# Patient Record
(demographics unavailable — no encounter records)

---

## 2019-01-01 NOTE — FRENECTOMY PROCEDURE NOTE
Procedure Note


Preoperative


Date of Service:  Jul 9, 2019


Time of Procedure:  12:30





Vital Signs








  Date Time  Temp Pulse Resp B/P (MAP) Pulse Ox O2 Delivery O2 Flow Rate FiO2


 


7/9/19 02:05 98.3 110 38     








Indication


Ankyloglossia


Risk/Time Out





Risk and benefits explained to patient or legal guardian, verbal and written 

consent given.





Time out performed, verified correct patient, correct procedure, correct site, 

and consent documented.


Technique





Lingual Frenectomy





Procedure





Infant was swaddled, securing the arms. The infant's head was held secure and 

the mouth was gently held open. A grooved tongue retracted was used to elevate 

the tongue and frenulum scissors were used to clip the lingual frenulum 

anteriorly until the tongue was able to move out to the lips.





Minimal blood loss, less than 1 mL





No Complications











STEPHAN VICENTE MD                Jul 9, 2019 12:44

## 2019-01-01 NOTE — NEWBORN INFANT H&P-ADMISSION
[place] Infant Record


Exam Date & Time


Date seen by provider:  2019


Time seen by provider:  09:44





Provider


PCP


Dr. Vallecillo





Delivery Assessment


Expected Date of Delivery:  Jul 10, 2019


Hx :  2


Hx Para:  2


Gestational Age in Weeks:  39


Gestational Age in Days:  6


Delivery Date:  2019


Delivery Time:  0007


Condition of Infant:  Living


Infant Delivery Method:  Spontaneous Vaginal


Operative Indications (Cesarea:  N/A-Vaginal Delivery


Prenatal Events:  Routine Prenatal care


Gender:  Male


Viability:  Living





Mother's Group Strep


Mother's Group B Strep:  Negative





Maternal Labs


Blood Type:  O+


HIV:  Negative


Hep B:  Negative


Rubella:  Immune


Triple/Quad Screen:  Normal





Apgar Score


Apgar Score at 1 Minute:  8


Apgar Score at 5 Minutes:  9





Condition/Feeding


Benefits of breastfeeding discussed with mother.


 Feeding Method:  Breast Milk-Exclusive


Gestation:  Single





Admission Examination


Level of Alertness:  Alert


Cry Description:  Lusty


Activity/State:  Quiet Alert


Suckling:  Suckled w Encouragement


Head Circumference:  14.25


Fontanelles:  Soft, Flat; No Bulging, No Full, No Depressed, No Tight


Anterior [place] Descriptio:  WNL


Sclera Description:  Clear; No Drainage, No Reddened, No Inflammation, No Edema,

No Tearing


Ears:  Normal


Mouth, Nose, Eyes:  Hard & Soft Palate Intact; No Cleft Nares; Nares Patent 

Bilateral; No Cleft Palate


Neck:  Head Mobile, Clavicles Intact


Chest Circumference:  13.00


Cardiovascular:  Regular Rhythm, Murmur (3/6 at loudest at the LLSB.), Brachial 

Pulses Equal; No Distant Sounds; Femoral Pulses Equal


Respiratory:  Regular; No Irregular, No Nasal Flaring, No Expiratory Grunt, No 

Unlabored, No Labored, No Retractions


Breath Sounds:  Clear; No Crackles; Equal; No Wheezes


Abdomen:  Soft; No Distended; Bowel Sounds Audible


Abdomen Circumference:  11.50


Genitalia:  Appear Normal, Testicles Descended


Back:  Spine Closed, Gluteal Folds Equal, Anus Patent, Sacral Dimple


Hips:  WNL


Movement:  Symmetric-Body, Full ROM, Symmetric-Face


Muscle Tone:  Active


Extremities:  5 digits present on each extremity


Reflexes:  [place], Suck, Grasp-Bilateral





Weight/Height


Height (Inches):  19.50


Height (Calculated Centimeters:  49.544519


Weight (Pounds):  7


Weight (Ounces):  10.0


Weight (Calculated Kilograms):  3.594220


Weight (Calculated Grams):  3458.642





Vital Signs





Vital Signs








  Date Time  Temp Pulse Resp B/P (MAP) Pulse Ox O2 Delivery O2 Flow Rate FiO2


 


19 02:05 98.3 110 38     


 


19 00:45 99.0       








Laboratory Tests


19 09:35: 





Impression on Admission


Impression on Admission:  Living, Term


39 6/7 WGA infant born via  to a  now 2 mom.  Mom had fever of 100.4 in

labor.





Progress/Plan/Problem List





(1) 


Qualifiers:  


   Qualified Codes:  Z38.2 - Single liveborn infant, unspecified as to place of 

birth


Assessment & Plan:  1.  Received Vitamin K and Erythromycin


2.  Needs Hep B.


3.  Needs state  screen.


4.  Needs CCHD screen.


5.  Needs hearing screen.


6.  Plan f/u with Dr. Vallecillo.





(2) At risk for sepsis in 


Assessment & Plan:  Obtain CXR, CBC, CRP, and blood culture due to maternal 

fever.  Infant has had 2 episodes of color change.  Will start antibiotics.  





(3) At risk for hyperbilirubinemia in 


Assessment & Plan:  Obtain routine bili at 24 hours.





(4) Tight lingual frenulum


Assessment & Plan:  Will do frenulotomy today.








Copy


Copies To 1:   CECILY VALLECILLO MD, SUSAN L MD                2019 09:46

## 2019-01-01 NOTE — NUR
IV infiltrated and removed, IV start attempted in left antecub, good flash but unable to run 
fluids, IV cath removed and infant wrapped and returned to mother. Mother educated on POC 
and will return with next shift to establish an IV.

## 2019-01-01 NOTE — NUR
[place] vacuum assisted delivery  of viable male infant per dr. sousa.  Infant placed up on 
mothers abdomen and cord double clamped and cut per  Infant bulb suction mouth and both 
nares. Dried and stimulated. Vigorous cry noted. HR above 100bpm. 0008 wet linens removed 
and dry towel placed on infant, cont to dry and stimulate, cont to use bulb suction prn for 
mucous. 0011 stockinette to head. Infant quiet alert at this time. 0012 infant placed skin 
to skin with mother, HR is 140-150bpm. 0015 ID bands applied to infant, mother and father. 
0021 Infant taken over to preheated radiant warmer per parents request. mec stool noted. 

0022 measurements taken. 0024 weight obtained. 0025 vitamin K given, 0027 foot prints taken, 
stockinette back on head, diaper applied, Hugs tag and EES done. 0028 infant bundled and 
given to FOB.

## 2019-01-01 NOTE — NUR
Infant to nursery for daily wt and IV needed re-securing. Infant to remain in nursery until 
next feeding per mother request.

## 2019-01-01 NOTE — NUR
Alex brought to nursery for am assessment. Im ampicillin given see MAR. Wet diaper changed. 
Circ asymptomatic Vaseline and gauze applied.

0811 Alex bundled and returned to mom in room 311.

## 2019-01-01 NOTE — NUR
ampicillin and gentamicin started per order.  infant resting under radiant warmer.  spo2 
88-92% fio2 21%.  dr stone here and aware

## 2019-01-01 NOTE — NUR
Attempted to restart IV 2 sticks to LAC via this nurse. Vein blew. URIEL Feldman RN  attempted IV 
x 2 sticks unsuccessful. 

1052 Notified Dr Brewster. "o" to have anesthesia attempt.

1055 Notified Tru RODRIGUEZ. Would be up when his case was finished.

## 2019-01-01 NOTE — NUR
Mother sitting up in rocker breastfeeding infant, mother states infant has had mec diaper 
but no void since delivery. Denies needs.

## 2019-01-01 NOTE — NUR
dr stone here and infant to [facility] for exam.  infant placed under radiant warmer.  exam done by 
dr stone.  new order for chest x-ray

## 2019-01-01 NOTE — NUR
infant to PP room with parents via open crib, crib contents explained, bulb suction at top 
of crib.

## 2019-01-01 NOTE — NUR
Assisted mother with breastfeeding, infant would open and hold nipple in mouth,  do 1-2 
sucks then stop. Attempted both sides with stimulation and no successful latch, suck and 
swallow. Discussed feeding record and feeding times/duration with mother. Infant placed up 
on mothers chest, skin to skin and discussed to watch for ques when infant is ready to feed.

## 2019-01-01 NOTE — NUR
Dr. Brewster here.  Infant in nursery. Consent reviewed. Time out taken to verify correct 
patient ID / procedure. Infant secured on circumstraint board. Circumcision done with1.3 
Goo without complications. No active bleeding noted. Dressed with Neosporin ointment and 
Vaseline gauze. Oral sucrose solution provided to infant during procedure. Diaper applied 
and infant bact to crib. Tolerated procedure well.

## 2019-01-01 NOTE — NUR
MOB requesting to switch to IM antibiotics.  Infant given IM dose of amp in mother's room.  
Infant held per MOB.  No concerns voiced by mother at time.

## 2019-01-01 NOTE — NUR
lab results called to dr stone.  infant  status reviewed R/T infant tongue tied and mother 
requesting frenotomy because of painful nursing.

## 2019-01-01 NOTE — NUR
Written discharge instructions reviewed with mom. Discharge instructions signed and copy 
given. ID bracelet #[id number] of mom and infant match. Footprint sheet signed by mother verifying 
correct ID number. Infant dismissed with mom, accompanied by staff. Infant secured into 
personal vehicle in rear-facing car seat. Condition stable. No signs or symptoms of 
distress. No concerns voiced via mom.

## 2019-01-01 NOTE — NUR
infant to [facility] for bathing per mothers request.  infant placed under radiant warmer for 
bathing. vss temp 98.1 p 106 resp 38.  color pink tones.

## 2019-01-01 NOTE — NUR
Infant swaddled in clean linen.  Awake in crib, resting quietly.  To mother's room at time 
with OB RN at side.

## 2019-01-01 NOTE — DISCHARGE INST-NURSERY
Discharge Inst-Nursery


Instructions/Follow Up


Patient Instructions/Follow Up:  


Follow up with Dr. Vallecillo this week.





Diet


Pediatric Feeding Method:  Breast


Pediatric Feeding Formula Type:  Breastmilk





Symptoms Report to Physician


Parent Questions Call:  Call your physician


For Problems/Questions:  Contact Your Physician





Skin/Wound Care


Circumcision:  Yes


Apply:  Vaseline for 5 days


Baby Discharge Weight:  3379g


Copies To 1:   CECILY VALLECILLO MD, LINDA K DO                Jul 15, 2019 10:29

## 2019-01-01 NOTE — NUR
mother preparing to nurse infant.  IV site remains patent.  appropriate bonding.  infant 
awake alert.

## 2019-01-01 NOTE — NUR
shift assessment completed.  vss skin color pink with yellow tones.  resp unlabored with 
breath sounds CTA.  HRRR.  abd soft with positive bowel sounds.  diaper change done and 
large void.  circumcision healing without drainage or bleeding.  vaseline gauze applied.  
infant moves all extremities actively  IV site infusing without signs if infiltration

## 2019-01-01 NOTE — NEWBORN INFANT-DISCHARGE
Infant Discharge


Subjective/Events-Last Exam


Doing well, no concerns.


Date Patient Was Seen:  Jul 15, 2019


Time Patient Was Seen:  10:31





Condition/Feeding


 Feeding Method:  Breast Milk-Exclusive





Discharge Examination


Level of Alertness:  Alert


Cry Description:  Lusty


Activity/State:  Active Alert


Suckling:  Rhythmically,Lips Flanged


Skin Comments:  


mild jaundice, improved


Head Circumference:  14.25


Fontanelles:  Soft, Flat; No Bulging, No Full, No Depressed, No Tight


Anterior [place] Descriptio:  WNL


Sclera Description:  Clear; No Drainage, No Reddened, No Inflammation, No Edema,

No Tearing


Ears:  Normal


Mouth, Nose, Eyes:  Hard & Soft Palate Intact; No Cleft Nares; Nares Patent 

Bilateral; No Cleft Palate


Red Reflex of the Eyes:  Present bilaterally


Neck:  Head Mobile, Clavicles Intact


Chest Circumference:  13.00


Cardiovascular:  Regular Rhythm, Brachial Pulses Equal, Femoral Pulses Equal


Respiratory:  Regular; No Irregular, No Nasal Flaring, No Expiratory Grunt, No 

Unlabored, No Labored, No Retractions


Breath Sounds:  Clear; No Crackles; Equal; No Wheezes


Abdomen:  Soft; No Distended; Bowel Sounds Audible


Abdomen Circumference:  11.50


Bowel Sounds:  Present


Genitalia:  Appear Normal, Testicles Descended


Genitalia Comments:  


healing circumcision


Back:  Spine Closed, Gluteal Folds Equal, Anus Patent, Sacral Dimple


Hips:  WNL


Movement:  Symmetric-Body, Full ROM, Symmetric-Face


Muscle Tone:  Active


Extremities:  5 digits present on each extremity


Reflexes:  Jorge, Suck, Grasp-Bilateral





Weight/Height


Height (Inches):  19.50


Height (Calculated Centimeters:  49.378809


Weight (Pounds):  7


Weight (Ounces):  7.2


Weight (Calculated Kilograms):  3.432419


Weight (Calculated Grams):  3379.263





Vital Signs/Labs/SS


Vital Signs





Vital Signs








  Date Time  Temp Pulse Resp B/P (MAP) Pulse Ox O2 Delivery O2 Flow Rate FiO2


 


7/15/19 08:53 98.9 120 30  100   


 


7/15/19 00:50 98.7       


 


19 22:50 98.9 140 44     


 


19 09:45 98.0 152 42     


 


19 20:39 98.3 144 40     


 


19 16:55 98.2 120 30     


 


19 15:13 97.6       


 


19 12:00 98.7 130 50     


 


19 08:10 98.5 160 48     


 


19 05:00  124 40     


 


19 02:55 98.4 120 36     


 


19 21:10 98.3 90 30     








Labs


Laboratory Tests


19 05:25: 


Sodium Level 140, Potassium Level 4.5, Chloride Level 109H, Carbon Dioxide Level

19L, Anion Gap 12, Blood Urea Nitrogen 4L, Creatinine 0.49L, BUN/Creatinine 

Ratio 8, Glucose Level 106H, Calcium Level 10.3H, Total Bilirubin 15.5*H, C-

Reactive Protein High Sensitivity 0.13





Microbiology


19 Blood Culture - Final, Complete


         No growth





Hearing Screening


Date of Hearing Screening:  Jul 10, 2019


Results of Hearing Screening:  Pass





Discharge Diagnosis/Plan


Discharge Diagnosis/Impression:  Living, Term


Impression Note:


39 6/7 WGA infant born via  to a  now 2 mom.  Mom had fever of 100.4 in

labor.


Diagnosis/Problems:  


(1) 


Qualifiers:  


   Qualified Codes:  Z38.2 - Single liveborn infant, unspecified as to place of 

birth


Assessment & Plan:  19: Term AGA  male infant, born via  at 39 

and 6/7 WGA to GBS-negative G2 now P2 mother. Birth weight 3459 grams, Apgars 

8/9, maternal blood type O+, infant blood type A+, FREDY negative. Breast-feeding,

voiding and stooling well. Has arranged for outpatient follow-up with Dr. Vallecillo.


   - Infant admitted to Level 2 nursery status due to  pneumonia, 

rooming-in with mother.


   - Received Vitamin K injection and erythromycin ophthalmic ointment following

dellivery.


   - Passed  hearing screen and CCHD screen.


   - Hep B vaccine administered 7/10/19.


   - Circumcision performed by Dr. Brewster on 19.


   - Anticipate discharge on the evening of Monday, 7/15/19, after final dose of

Ampicillin.


   - Follow up with Dr. Vallecillo after discharge.    -kmijaresmd.





19: Breast-feeding, voiding and stooling well. Temp stable in open crib, 

rooming-in with parents. 


   - Discharge home tomorrow evening after final dose of IV ampicillin.


   - Follow up with Dr. Vallecillo in 1 week.


   - Dr. Donahue to assume care tomorrow morning.   -svetlana.





7/15:  Doing well.  [facility] home today.  DC wt 7#7.2 (3379g)





(2) Congenital pneumonia


Qualifiers:  


   Qualified Codes:  P23.9 - Congenital pneumonia, unspecified


Assessment & Plan:   19: Sepsis work-up was initiated after birth due to 

maternal fever and infant desaturation episodes. Chest x-ray was consistent with

congenital/ pneumonia, and WBC was elevated with CRP trending up. Blood 

culture was obtained, and infant was started on IV ampicillin and gentamicin 

with plans to complete a total of 7 days (first doses received at about 10:40 am

on 19). The infant's IV came out on the morning of 19, nursing staff 

had difficulty re-starting, so antibiotics were administered via IM route for 

about 24 hours. The IV was successfully re-started on the morning of 19, 

and the infant has been receiving Ampicillin and Gentamicin via IV since then. 

No respiratory problems or temperature instability. Most recent CBC showed WBC 

down to normal on 7/10/19, with most recent CRP up to 1.41 on 7/10/19. Blood 

culture currently negative, at 4 days.


   - Continue IV ampicillin and gentamicin for a total of 7 days (final dose of 

antibiotics due on the evening of Monday 7/15/19).


   - Repeat CRP tomorrow morning to make sure it has gone down to normal.


   - Obtain BMP tomorrow morning to look for iatrogenic electrolyte abnormality 

caused by use of low-rate continuous IV fluids to keep IV site patent.    -

svetlana.





19: Blood culture negative at 5 days today. CBC, CRP and BMP all normal 

this morning (19). Time of antibiotic administration has gradually been 

advanced, so this morning's dose was started at 9:30 am.


   - Continue IV ampicillin and gentamicin for a total of 7 days (final dose of 

antibiotics due on the evening of Monday 7/15/19).


   - Will administer ampicillin every 11 hours instead of every 12 hours, so 

that final dose can be started at 6:30 pm tomorrow, to facilitate more 

convenient discharge time.


   - Dr. Donahue to assume care tomorrow morning.      -svetlana.





(3) At risk for hyperbilirubinemia in 


Assessment & Plan:  Per Dr. Brewster 19: "Infant bili initially in the high 

intermediate risk zone, but down to low risk today."





19: Mild jaundice noted on exam today.


      - Repeat bilirubin level with am labs.     -svetlana





19: Repeat bilirubin level today was in the low-intermediate risk zone 

(15.5 at 5 days of age). Jaundice improved on physical exam today.


      - Problem Resolved.              -svetlana.





(4) Tight lingual frenulum


Assessment & Plan:  Per Dr. Brewster 19: "Frenulotomy done yesterday.  Infant 

is doing well."





19: Infant has been breast-feeding well. 


      - Problem Resolved.     -carolmd.








Copy


Copies To 1:   CECILY VALLECILLO MD, LINDA K DO                Jul 15, 2019 10:33

## 2019-01-01 NOTE — DIAGNOSTIC IMAGING REPORT
INDICATION: 

39 weeks gestation, cyanosis.



FINDINGS:

The lung volumes are symmetric. No focal consolidation. Given the

supine technique with an apical lordotic orientation, the

cardiothymic silhouette appears unremarkable. The visualized

bowel gas pattern is unremarkable. No fracture deformity is

evident.



IMPRESSION:

No acute pathological finding at supine portable  chest.



Dictated by: 



  Dictated on workstation # [id number]

## 2019-01-01 NOTE — NUR
infant to room via crib.  spo2 95-96%  no color change noted.  infant awake and rooting.  IV 
patent

## 2019-01-01 NOTE — PROGRESS NOTE - NEWBORN
NB-Subjective/ROS


Subjective/ROS


Subjective/Events-last exam


Breast-feeding, voiding and stooling well. No concerns.





NB-Exam


Condition/Feeding


 Feeding Method:  Breast





Examination


Vitals





Vital Signs








  Date Time  Temp Pulse Resp B/P (MAP) Pulse Ox O2 Delivery O2 Flow Rate FiO2


 


19 09:45 98.0 152 42     


 


19 20:39 98.3 144 40     


 


19 16:55 98.2 120 30     


 


19 15:13 97.6       


 


19 12:00 98.7 130 50     


 


19 08:10 98.5 160 48     


 


19 05:00  124 40     


 


19 02:55 98.4 120 36     


 


19 21:10 98.3 90 30     


 


19 08:00 98.2 132 56     


 


19 06:10 99.0 116 36     


 


19 02:10 98.5 150 44  100   


 


19 18:50     98   


 


19 16:00 98.0 138 40     








Level of Alertness:  Alert


Cry Description:  Lusty


Activity/State:  Active Alert


Suckling:  Rhythmically,Lips Flanged


Skin Comments:  


mild jaundice, improved


Head Circumference:  14.25


Fontanelles:  Soft, Flat


Anterior [place] Descriptio:  WNL


Sclera Description:  Clear


Ears:  Normal


Mouth, Nose, Eyes:  Hard & Soft Palate Intact, Nares Patent Bilateral


Red Reflex of the Eyes:  Present bilaterally


Neck:  Head Mobile, Clavicles Intact


Chest Circumference:  13.00


Cardiovascular:  Regular Rhythm, Brachial Pulses Equal, Femoral Pulses Equal


Respiratory:  Regular


Breath Sounds:  Clear, Equal


Abdomen:  Soft, Bowel Sounds Audible


Abdomen Circumference:  11.50


Bowel Sounds:  Present


Genitalia:  Appear Normal, Testicles Descended


Genitalia Comments:  


healing circumcision


Back:  Spine Closed, Gluteal Folds Equal, Anus Patent, Sacral Dimple


Hips:  WNL


Movement:  Symmetric-Body, Full ROM, Symmetric-Face


Muscle Tone:  Active


Extremities:  5 digits present on each extremity


Reflexes:  Jorge, Suck, Grasp-Bilateral





Weight/Height(Last Documented)


Height (Inches):  19.50


Height (Calculated Centimeters:  49.202241


Weight (Pounds):  7


Weight (Ounces):  8.0


Weight (Calculated Kilograms):  3.211112


Weight (Calculated Grams):  3401.943





Labs


Labs


Laboratory Tests


19 05:25: 


Sodium Level 140, Potassium Level 4.5, Chloride Level 109H, Carbon Dioxide Level

19L, Anion Gap 12, Blood Urea Nitrogen 4L, Creatinine 0.49L, BUN/Creatinine 

Ratio 8, Glucose Level 106H, Calcium Level 10.3H, Total Bilirubin 15.5*H, C-

Reactive Protein High Sensitivity 0.13





Microbiology


19 Blood Culture - Preliminary, Resulted


         No growth





NB-Plan/Progress


Plan/Progress


See below


Diagnosis/Problems:  


(1) 


Qualifiers:  


   Qualified Codes:  Z38.2 - Single liveborn infant, unspecified as to place of 

birth


Assessment & Plan:  19: Term AGA  male infant, born via  at 39 

and 6/7 WGA to GBS-negative G2 now P2 mother. Birth weight 3459 grams, Apgars 

8/9, maternal blood type O+, infant blood type A+, FREDY negative. Breast-feeding,

voiding and stooling well. Has arranged for outpatient follow-up with Dr. Vallecillo.


   - Infant admitted to Level 2 nursery status due to  pneumonia, 

rooming-in with mother.


   - Received Vitamin K injection and erythromycin ophthalmic ointment following

dellivery.


   - Passed  hearing screen and CCHD screen.


   - Hep B vaccine administered 7/10/19.


   - Circumcision performed by Dr. Brewster on 19.


   - Anticipate discharge on the evening of Monday, 7/15/19, after final dose of

Ampicillin.


   - Follow up with Dr. Vallecillo after discharge.    -svetlana.





19: Breast-feeding, voiding and stooling well. Temp stable in open crib, 

rooming-in with parents. 


   - Discharge home tomorrow evening after final dose of IV ampicillin.


   - Follow up with Dr. Vallecillo in 1 week.


   - Dr. Donahue to assume care tomorrow morning.   -svetlana.





(2) Congenital pneumonia


Qualifiers:  


   Qualified Codes:  P23.9 - Congenital pneumonia, unspecified


Assessment & Plan:   19: Sepsis work-up was initiated after birth due to 

maternal fever and infant desaturation episodes. Chest x-ray was consistent with

congenital/ pneumonia, and WBC was elevated with CRP trending up. Blood 

culture was obtained, and infant was started on IV ampicillin and gentamicin 

with plans to complete a total of 7 days (first doses received at about 10:40 am

on 19). The infant's IV came out on the morning of 19, nursing staff 

had difficulty re-starting, so antibiotics were administered via IM route for 

about 24 hours. The IV was successfully re-started on the morning of 19, 

and the infant has been receiving Ampicillin and Gentamicin via IV since then. 

No respiratory problems or temperature instability. Most recent CBC showed WBC 

down to normal on 7/10/19, with most recent CRP up to 1.41 on 7/10/19. Blood 

culture currently negative, at 4 days.


   - Continue IV ampicillin and gentamicin for a total of 7 days (final dose of 

antibiotics due on the evening of Monday 7/15/19).


   - Repeat CRP tomorrow morning to make sure it has gone down to normal.


   - Obtain BMP tomorrow morning to look for iatrogenic electrolyte abnormality 

caused by use of low-rate continuous IV fluids to keep IV site patent.    -

svetlana.





19: Blood culture negative at 5 days today. CBC, CRP and BMP all normal 

this morning (19). Time of antibiotic administration has gradually been 

advanced, so this morning's dose was started at 9:30 am.


   - Continue IV ampicillin and gentamicin for a total of 7 days (final dose of 

antibiotics due on the evening of Monday 7/15/19).


   - Will administer ampicillin every 11 hours instead of every 12 hours, so 

that final dose can be started at 6:30 pm tomorrow, to facilitate more 

convenient discharge time.


   - Dr. Donahue to assume care tomorrow morning.      -svetlana.





(3) At risk for hyperbilirubinemia in 


Assessment & Plan:  Per Dr. Brewster 19: "Infant bili initially in the high 

intermediate risk zone, but down to low risk today."





19: Mild jaundice noted on exam today.


      - Repeat bilirubin level with am labs.     -svetlana





19: Repeat bilirubin level today was in the low-intermediate risk zone 

(15.5 at 5 days of age). Jaundice improved on physical exam today.


      - Problem Resolved.              -kmijaresmd.





(4) Tight lingual frenulum


Assessment & Plan:  Per Dr. Brewster 19: "Frenulotomy done yesterday.  Infant 

is doing well."





19: Infant has been breast-feeding well. 


      - Problem Resolved.     -svetlana.














CASTRO FABIAN MD            2019 13:31

## 2019-01-01 NOTE — PROGRESS NOTE - NEWBORN
NB-Subjective/ROS


Subjective/ROS


Subjective/Events-last exam


Feeding, voiding and stooling well. Temp stable in bassinet, rooming in with 

mom, no respiratory issues.





NB-Exam


Condition/Feeding


 Feeding Method:  Breast





Examination


Vitals





Vital Signs








  Date Time  Temp Pulse Resp B/P (MAP) Pulse Ox O2 Delivery O2 Flow Rate FiO2


 


19 15:13 97.6       


 


19 12:00 98.7 130 50     


 


19 08:10 98.5 160 48     


 


19 05:00  124 40     


 


19 02:55 98.4 120 36     


 


19 21:10 98.3 90 30     


 


19 08:00 98.2 132 56     


 


19 06:10 99.0 116 36     


 


19 02:10 98.5 150 44  100   


 


19 18:50     98   


 


19 16:00 98.0 138 40     


 


19 12:00 98.6 140 44     


 


19 08:55 98.6 134 42     


 


19 03:19 98.1 118 34     


 


19 01:37 98.0 120 40     


 


7/10/19 22:00 98.8 116 36     


 


7/10/19 18:25 98.1 118 30     








Level of Alertness:  Alert


Cry Description:  Lusty


Activity/State:  Active Alert


Suckling:  Rhythmically,Lips Flanged


Skin Comments:  


mild jaundice


Head Circumference:  14.25


Fontanelles:  Soft, Flat


Anterior [place] Descriptio:  WNL


Sclera Description:  Clear


Ears:  Normal


Mouth, Nose, Eyes:  Hard & Soft Palate Intact, Nares Patent Bilateral


Neck:  Head Mobile, Clavicles Intact


Chest Circumference:  13.00


Cardiovascular:  Regular Rhythm, Brachial Pulses Equal, Femoral Pulses Equal


Respiratory:  Regular


Breath Sounds:  Clear, Equal


Abdomen:  Soft, Bowel Sounds Audible


Abdomen Circumference:  11.50


Bowel Sounds:  Present


Genitalia:  Appear Normal, Testicles Descended


Genitalia Comments:  


healing circumcision


Back:  Spine Closed, Gluteal Folds Equal, Anus Patent, Sacral Dimple


Hips:  WNL


Movement:  Symmetric-Body, Full ROM, Symmetric-Face


Muscle Tone:  Active


Extremities:  5 digits present on each extremity


Reflexes:  [place], Suck, Grasp-Bilateral





Weight/Height(Last Documented)


Height (Inches):  19.50


Height (Calculated Centimeters:  49.113562


Weight (Pounds):  7


Weight (Ounces):  6.9


Weight (Calculated Kilograms):  3.141248


Weight (Calculated Grams):  3370.758





Labs


Labs





Microbiology


19 Blood Culture - Preliminary, Resulted


         No growth





NB-Plan/Progress


Plan/Progress


See below


Diagnosis/Problems:  


(1) 


Qualifiers:  


   Qualified Codes:  Z38.2 - Single liveborn infant, unspecified as to place of 

birth


Assessment & Plan:  19: Term AGA  male infant, born via  at 39 

and 6/7 WGA to GBS-negative G2 now P2 mother. Birth weight 3459 grams, Apgars 

8/9, maternal blood type O+, infant blood type A+, FREDY negative. Breast-feeding,

voiding and stooling well. Has arranged for outpatient follow-up with Dr. Vallecillo.


   - Infant admitted to Level 2 nursery status due to  pneumonia, 

rooming-in with mother.


   - Received Vitamin K injection and erythromycin ophthalmic ointment following

dellivery.


   - Passed  hearing screen and CCHD screen.


   - Hep B vaccine administered 7/10/19.


   - Circumcision performed by Dr. Brewster on 19.


   - Anticipate discharge on the evening of Monday, 7/15/19, after final dose of

Ampicillin.


   - Follow up with Dr. Vallecillo after discharge.    -[facility].





(2) Congenital pneumonia


Qualifiers:  


   Qualified Codes:  P23.9 - Congenital pneumonia, unspecified


Assessment & Plan:  19: Sepsis work-up was initiated after birth due to 

maternal fever and infant desaturation episodes. Chest x-ray was consistent with

congenital/ pneumonia, and WBC was elevated with CRP trending up. Blood 

culture was obtained, and infant was started on IV ampicillin and gentamicin 

with plans to complete a total of 7 days (first doses received at about 10:40 am

on 19). The infant's IV came out on the morning of 19, nursing staff 

had difficulty re-starting, so antibiotics were administered via IM route for 

about 24 hours. The IV was successfully re-started on the morning of 19, 

and the infant has been receiving Ampicillin and Gentamicin via IV since then. 

No respiratory problems or temperature instability. Most recent CBC showed WBC 

down to normal on 7/10/19, with most recent CRP up to 1.41 on 7/10/19. Blood 

culture currently negative, at 4 days.


   - Continue IV ampicillin and gentamicin for a total of 7 days (final dose of 

antibiotics due on the evening of Monday 7/15/19).


   - Repeat CRP tomorrow morning to make sure it has gone down to normal.


   - Obtain BMP tomorrow morning to look for iatrogenic electrolyte abnormality 

caused by use of low-rate continuous IV fluids to keep IV site patent.


                  -svetlana.





(3) At risk for hyperbilirubinemia in 


Assessment & Plan:  Per Dr. Brewster 19: "Infant bili initially in the high 

intermediate risk zone, but down to low risk today."





19: Mild jaundice noted on exam today.


      - Repeat bilirubin level with am labs.     -svetlana





(4) Tight lingual frenulum


Assessment & Plan:  Per Dr. Brewster 19: "Frenulotomy done yesterday.  Infant 

is doing well."





19: Infant has been breast-feeding well. 


      - Problem Resolved.     -svetlana.














CASTRO FABIAN MD            2019 16:52

## 2019-01-01 NOTE — NUR
1240 ROBBIE FULLER CRNA TO NURSERY TO TRY TO ATTEMPT TO START INFANT IV. 1250 IV ATTEMPT X2 
WITHOUT SUCCESS.  INFANT RETURNED TO MOM VIA OPEN CRIB FOR MOM TO HOLD.

## 2019-01-01 NOTE — NUR
nb resting in open crib assessment completed. plan of care discussed with mother. all 
questions answered. will continue to monitor

## 2019-01-01 NOTE — NB CIRCUMCISION PROCEDURE NOTE
Circumcision Procedure Note


Preoperative Diagnosis


Pre-op Diagnosis


Redundant foreskin


Date of Service:  Jul 11, 2019





Risk/Time Out


Risk/Time Out


Risks, benefits, indications and contraindications of circumcision were 

discussed with parents (s) or legal guardian and they desire to proceed.





Time out was performed, verifying that written informed consent for circumcision

is on the chart, the patient is the one specified on the consent, and that he 

possesses the required anatomy for circumcision.





The infant was secured on an infant board for his protection.





The penis was inspected and pertinent anatomy was found to be normal.


Oral sucrose provided:  Yes





Local Anesthetic


Penis was cleansed with:  Betadine


Nerve Block or SubQ Ring


Subcutaneous Ring Block





A total of 0.5 mL


of 1% lidocaine without epinephrine was injected in divided aliquots into the 

subcutaneous tissue on the shaft of the penis in a circumferential fashion.





Procedure


Procedure Note:


Once anesthesia was administered, hemostats were attached to the foreskin for 

traction.  Adhesions were bluntly lysed.  After lifting the foreskin away from 

the glans, a straight hemostat was aligned parallel to the penile shaft and 

clamped at the 12 o'clock position creating a hemostatic area to the dorsal 

prepuce. A dorsal slit was then created by sharp dissection through the crushed 

tissue.  The foreskin was degloved off the glans and remaining adhesions were 

lysed with traction.  The urethral meatus was inspected and found to have normal

anatomy.





Circumcision Technique


Bell Size:  1.3





Post Procedure


Post Procedure Note:


Baby tolerated the procedure well without complications.





The betadine was washed off the baby's skin.  He was diapered and returned to 

his parent(s)/caregiver(s).





They were given verbal and written instructions on proper care of the 

circumcised penis.


Dressing:  Vaseline Gauze





Estimated Blood Loss


Bleeding:  Minimal


Less than 1 mL:  Yes


Post-op Diagnosis/Impression


Normal circumcised penis.











STEPHAN VICENTE MD               Jul 11, 2019 09:09

## 2019-01-01 NOTE — NUR
8335-4146 hrs:  IV restarted with total 1 stick jeannie zafar rn and 4 sticks justin RN.  Dr Brewster 
here and assisted with procedure.  IV started in LT foot.  d10w infusing at 5ml/hr/pump.

## 2019-01-01 NOTE — NUR
Infant to nursery.  VS taken.  Assessment performed.  Ampicillin given IM per order.  Daily 
weight obtained.  Infant voided on clothes and blankets.  infant wrapped in clean t shirt 
and new blankets.

## 2019-01-01 NOTE — NUR
frenotomy done by dr stone.  infant returned to room after procedure without any notable 
bleeding.  infant accompanied to room by dr stone.  status and plan of care reviewed.

## 2019-01-01 NOTE — ANESTHESIA-PROCEDURE NOTE
Procedures/Interventions


Procedure Start/Stop/Diagnosis


Date of Procedure:  Jul 11, 2019


Start Time:  12:50


Stop Time:  13:10





Central Line/IV Access


IV :  


   Location:  Right


   Site:  Antecubital


   IV Catheter Type:  Peripheral IV











SABINE FULLER CRNA            Jul 11, 2019 13:13

## 2019-01-01 NOTE — NUR
Infant to the [facility] for assessment per Dr Brewster at this time.

## 2019-01-01 NOTE — PROGRESS NOTE - NEWBORN
NB-Subjective/ROS


Subjective/ROS


Subjective/Events-last exam


Infant continues to feed well over night.  Bili increased this am, but remains 

below LL.  IV came out.





NB-Exam


Condition/Feeding


[place] Feeding Method:  Breast





Examination


Vitals





Vital Signs








  Date Time  Temp Pulse Resp B/P (MAP) Pulse Ox O2 Delivery O2 Flow Rate FiO2


 


19 03:19 98.1 118 34     


 


19 01:37 98.0 120 40     


 


7/10/19 22:00 98.8 116 36     


 


7/10/19 18:25 98.1 118 30     


 


7/10/19 14:02 98.1 105 32     


 


7/10/19 08:45 98.0 122 34  99   


 


7/10/19 03:30 98.1 116 32  99   


 


7/10/19 01:48 98.0 112 30     


 


19 19:40 98.2 120 36     


 


19 07:30 98.1 106 33     


 


19 02:05 98.3 110 38     


 


19 00:45 99.0       








Cry Description:  Lusty


Activity/State:  Drowsy


Suckling:  Rhythmically,Lips Flanged


Skin:  [place]


Head Circumference:  14.25


Fontanelles:  Soft, Flat


Anterior [place] Descriptio:  WNL


Sclera Description:  Clear


Ears:  Normal


Mouth, Nose, Eyes:  Hard & Soft Palate Intact, Nares Patent Bilateral


Neck:  Head Mobile, Clavicles Intact


Chest Circumference:  13.00


Cardiovascular:  Regular Rhythm, Murmur (3/6 at loudest at the LLSB.), Brachial 

Pulses Equal, Femoral Pulses Equal


Respiratory:  Regular


Breath Sounds:  Clear, Equal


Abdomen:  Soft, Bowel Sounds Audible


Abdomen Circumference:  11.50


Genitalia:  Appear Normal, Testicles Descended


Back:  Spine Closed, Gluteal Folds Equal, Anus Patent, Sacral Dimple


Hips:  WNL


Movement:  Symmetric-Body, Full ROM, Symmetric-Face


Muscle Tone:  Active


Extremities:  5 digits present on each extremity


Reflexes:  [place], Suck, Grasp-Bilateral





Weight/Height(Last Documented)


Height (Inches):  19.50


Height (Calculated Centimeters:  49.460523


Weight (Pounds):  7


Weight (Ounces):  5.1


Weight (Calculated Kilograms):  3.738528


Weight (Calculated Grams):  3319.729





Labs


Labs


Laboratory Tests


7/10/19 12:57:  Total Bilirubin 9.6H


19 06:46:  Total Bilirubin 13.0*H





Microbiology


19 Blood Culture - Preliminary, Resulted


         No growth


Meds


Amp and Gent





NB-Plan/Progress


Plan/Progress


Diagnosis/Problems:  


(1) Congenital pneumonia


Qualifiers:  


   Qualified Codes:  P23.9 - Congenital pneumonia, unspecified


Assessment & Plan:  Infant with elevated WBC and CRP trending up.  CXR c/w with 

pneumonia.  Plan for 7 days of Amp and Gent.  Infant stable.  Will restart IV 

after circ.





(2) 


Qualifiers:  


   Qualified Codes:  Z38.2 - Single liveborn infant, unspecified as to place of 

birth


Assessment & Plan:  1.  Received Vitamin K and Erythromycin.


2.  Passed hearing screen


3.  Received Hep B.


4.  [place] state screen sent.


5.  Needs CCHD prior to d/c.


6.  Circ today.





Plans f/u with Dr. Vallecillo.





(3) At risk for hyperbilirubinemia in 


Assessment & Plan:  Infant remains in the high intermediate risk zone.  Will 

repeat tomorrow am.





(4) Tight lingual frenulum


Assessment & Plan:  Frenulotomy done yesterday.  Infant is doing well.





(5) At risk for sepsis in 


Assessment & Plan:  Blood cultures pending.  NTD.














STEPHAN VICENTE MD               2019 08:51

## 2019-01-01 NOTE — NUR
infant spitting up large amts mucous  color change to dusky. mouth and nares suctioned with 
bulb syringe by A.Back surgical tech.  large amt mucous suctioned.  color improved.

## 2019-01-01 NOTE — PROGRESS NOTE - NEWBORN
NB-Subjective/ROS


Subjective/ROS


Subjective/Events-last exam


Infant stable over night.  Mom reports a few episodes of what seemed like 

choking with feeds, but no color change reported.  +BM/void.  IV infusing wit

hout difficulty.





NB-Exam


Condition/Feeding


 Feeding Method:  Breast





Examination


Vitals





Vital Signs








  Date Time  Temp Pulse Resp B/P (MAP) Pulse Ox O2 Delivery O2 Flow Rate FiO2


 


7/10/19 03:30 98.1 116 32  99   


 


7/10/19 01:48 98.0 112 30     


 


19 19:40 98.2 120 36     


 


19 07:30 98.1 106 33     


 


19 02:05 98.3 110 38     


 


19 00:45 99.0       








Level of Alertness:  Sleeping


Cry Description:  Lusty


Activity/State:  Drowsy


Suckling:  Suckled w Encouragement


Skin:  [place]


Head Circumference:  14.25


Fontanelles:  Soft, Flat


Anterior [place] Descriptio:  WNL


Sclera Description:  Clear


Ears:  Normal


Mouth, Nose, Eyes:  Hard & Soft Palate Intact, Nares Patent Bilateral


Neck:  Head Mobile, Clavicles Intact


Chest Circumference:  13.00


Cardiovascular:  Regular Rhythm, Murmur (Soft 1/6 at LLSB), Brachial Pulses 

Equal, Femoral Pulses Equal


Respiratory:  Regular


Breath Sounds:  Clear, Equal


Abdomen:  Soft, Bowel Sounds Audible


Abdomen Circumference:  11.50


Genitalia:  Appear Normal, Testicles Descended


Back:  Spine Closed, Gluteal Folds Equal, Anus Patent, Sacral Dimple


Hips:  WNL


Movement:  Symmetric-Body, Full ROM, Symmetric-Face


Muscle Tone:  Active


Extremities:  5 digits present on each extremity


Reflexes:  [place], Suck, Grasp-Bilateral





Weight/Height(Last Documented)


Height (Inches):  19.50


Height (Calculated Centimeters:  49.864175


Weight (Pounds):  7


Weight (Ounces):  5.3


Weight (Calculated Kilograms):  3.546179


Weight (Calculated Grams):  3325.399





Labs


Labs


Laboratory Tests


19 09:35: 


White Blood Count 19.0H, Red Blood Count 4.32, Hemoglobin 15.3, Hematocrit 45, 

Mean Corpuscular Volume 104, Mean Corpuscular Hemoglobin 35, Mean Corpuscular 

Hemoglobin Concent 34, Red Cell Distribution Width 17.0H, Platelet Count 209, 

Mean Platelet Volume 10.1, Neutrophils (%) (Auto) 78H, Lymphocytes (%) (Auto) 

11L, Monocytes (%) (Auto) 11, Eosinophils (%) (Auto) 0, Basophils (%) (Auto) 0, 

Neutrophils # (Auto) 14.8H, Lymphocytes # (Auto) 2.1L, Monocytes # (Auto) 2.0H, 

Eosinophils # (Auto) 0.1, Basophils # (Auto) 0.1, Neutrophils % (Manual) 71, 

Lymphocytes % (Manual) 16, Monocytes % (Manual) 7, Eosinophils % (Manual) 1, Bas

ophils % (Manual) 0, Band Neutrophils 5, Polychromasia MODERATE, Anisocytosis 

SLIGHT, Macrocytosis MODERATE


19 09:48: C-Reactive Protein High Sensitivity 0.66H


7/10/19 01:10: 


White Blood Count 16.3, Red Blood Count 4.38, Hemoglobin 15.5, Hematocrit 45, 

Mean Corpuscular Volume 103, Mean Corpuscular Hemoglobin 35, Mean Corpuscular 

Hemoglobin Concent 35, Red Cell Distribution Width 17.5H, Platelet Count 252, 

Mean Platelet Volume 9.8, Neutrophils (%) (Auto) 63, Lymphocytes (%) (Auto) 27, 

Monocytes (%) (Auto) 8, Eosinophils (%) (Auto) 2, Basophils (%) (Auto) 0, 

Neutrophils # (Auto) 10.3H, Lymphocytes # (Auto) 4.4, Monocytes # (Auto) 1.3H, 

Eosinophils # (Auto) 0.3, Basophils # (Auto) 0.0, Neutrophils % (Manual) 67, 

Lymphocytes % (Manual) 21, Monocytes % (Manual) 9, Eosinophils % (Manual) 3, 

Polychromasia SLIGHT, Anisocytosis SLIGHT, Macrocytosis MODERATE, C-Reactive 

Protein High Sensitivity 1.41H,  Total Bilirubin 7.9H


Meds


Amp and Gent





NB-Plan/Progress


Plan/Progress


Diagnosis/Problems:  


(1) Congenital pneumonia


Qualifiers:  


   Qualified Codes:  P23.9 - Congenital pneumonia, unspecified


Assessment & Plan:  Infant with elevated WBC and CRP trending up.  CXR c/w with 

pneumonia.  Plan for 7 days of Amp and Gent.





(2) [place]


Qualifiers:  


   Qualified Codes:  Z38.2 - Single liveborn infant, unspecified as to place of 

birth


Assessment & Plan:  1.  Received Vitamin K and Erythromycin.


2.  Passed hearing screen


3.  Received Hep B.


4.   state screen sent.


5.  Needs CCHD prior to d/c.


6.  Plans f/u with Dr. Vallecillo.





(3) Tight lingual frenulum


Assessment & Plan:  Frenulotomy done yesterday.  Infant is doing well.





(4) At risk for hyperbilirubinemia in 


Assessment & Plan:  Infant's 24 hour bili in the high intermediate risk zone.  

Will repeat bili at noon.





(5) At risk for sepsis in 


Assessment & Plan:  Blood cultures pending.  NTD.














STEPHAN VICENTE MD               Jul 10, 2019 08:46

## 2019-01-01 NOTE — PROGRESS NOTE - NEWBORN
NB-Subjective/ROS


Subjective/ROS


Subjective/Events-last exam


Infant continues to feed well.  IV out since yesterday and not able to be 

restarted.  Abx switched to IM.





NB-Exam


Condition/Feeding


[place] Feeding Method:  Breast





Examination


Vitals





Vital Signs








  Date Time  Temp Pulse Resp B/P (MAP) Pulse Ox O2 Delivery O2 Flow Rate FiO2


 


19 06:10 99.0 116 36     


 


19 02:10 98.5 150 44  100   


 


19 18:50     98   


 


19 16:00 98.0 138 40     


 


19 12:00 98.6 140 44     


 


19 08:55 98.6 134 42     


 


19 03:19 98.1 118 34     


 


19 01:37 98.0 120 40     


 


7/10/19 22:00 98.8 116 36     


 


7/10/19 18:25 98.1 118 30     


 


7/10/19 14:02 98.1 105 32     


 


7/10/19 08:45 98.0 122 34  99   


 


7/10/19 03:30 98.1 116 32  99   


 


7/10/19 01:48 98.0 112 30     


 


19 19:40 98.2 120 36     








Level of Alertness:  Alert


Cry Description:  Lusty


Activity/State:  Active Alert


Suckling:  Rhythmically,Lips Flanged


Skin:  [place]


Head Circumference:  14.25


Fontanelles:  Soft, Flat


Anterior [place] Descriptio:  WNL


Sclera Description:  Clear


Ears:  Normal


Mouth, Nose, Eyes:  Hard & Soft Palate Intact, Nares Patent Bilateral


Neck:  Head Mobile, Clavicles Intact


Chest Circumference:  13.00


Cardiovascular:  Regular Rhythm, Brachial Pulses Equal, Femoral Pulses Equal


Respiratory:  Regular


Breath Sounds:  Clear, Equal


Abdomen:  Soft, Bowel Sounds Audible


Abdomen Circumference:  11.50


Bowel Sounds:  Present


Genitalia:  Appear Normal, Testicles Descended


Back:  Spine Closed, Gluteal Folds Equal, Anus Patent, Sacral Dimple


Hips:  WNL


Movement:  Symmetric-Body, Full ROM, Symmetric-Face


Muscle Tone:  Active


Extremities:  5 digits present on each extremity


Reflexes:  [place], Suck, Grasp-Bilateral





Weight/Height(Last Documented)


Height (Inches):  19.50


Height (Calculated Centimeters:  49.406437


Weight (Pounds):  7


Weight (Ounces):  3.7


Weight (Calculated Kilograms):  3.938331


Weight (Calculated Grams):  3280.040





Labs


Labs


Laboratory Tests


19 08:35:  Total Bilirubin 13.9*H





Microbiology


19 Blood Culture - Preliminary, Resulted


         No growth


Meds


Amp and Gent





NB-Plan/Progress


Plan/Progress


Diagnosis/Problems:  


(1) Congenital pneumonia


Qualifiers:  


   Qualified Codes:  P23.9 - Congenital pneumonia, unspecified


Assessment & Plan:  Infant with elevated WBC and CRP trending up.  CXR c/w with 

pneumonia.  Plan for 7 days of Amp and Gent.  Unable to restart IV yesterday.  

Received IM abx over night.  Will attempt IV again today.





(2) 


Qualifiers:  


   Qualified Codes:  Z38.2 - Single liveborn infant, unspecified as to place of 

birth


Assessment & Plan:  1.  Received Vitamin K and Erythromycin.


2.  Passed hearing screen


3.  Received Hep B.


4.  [place] state screen sent.


5.  Needs CCHD prior to d/c.


6.  Plans f/u with Dr. Vallecillo.





Dr. Ren to assume care this pm.





(3) At risk for hyperbilirubinemia in 


Assessment & Plan:  Infant bili initially in the high intermediate risk zone, 

but down to low risk today.





(4) Tight lingual frenulum


Assessment & Plan:  Frenulotomy done yesterday.  Infant is doing well.





(5) At risk for sepsis in 


Assessment & Plan:  Blood cultures pending.  NTD.














STEPHAN VICENTE MD               2019 09:25

## 2019-01-01 NOTE — NUR
NG suction with 8F NG cath for thick secretions.  infant tolerated without bradycardia or 
hypoxia.  approx 5 ml thick mucoid fluid return

## 2019-01-01 NOTE — NUR
Dr. Ren called and informed of bad IV.  Orders received to switch to IM antibiotics or 
restart IV, depending on MOB's preference.

## 2019-01-01 NOTE — NUR
MOB breastfeeding.  IV site assessed, puffy at time.  Attempted to flush IV, would not 
flush.  IV fluids stopped.

## 2021-07-27 NOTE — DIAGNOSTIC IMAGING REPORT
EXAMINATION: Abdomen 1 view



HISTORY: CONSTIPATION



COMPARISON: None available.



FINDINGS: 



Moderate amount of stool is present in the colon. No dilated

bowel or free air is seen.



IMPRESSION:



1. Moderate volume of stool without dilated bowel or free air.



Dictated by: 



  Dictated on workstation # [id number]